# Patient Record
Sex: FEMALE | Race: WHITE
[De-identification: names, ages, dates, MRNs, and addresses within clinical notes are randomized per-mention and may not be internally consistent; named-entity substitution may affect disease eponyms.]

---

## 2018-08-22 ENCOUNTER — HOSPITAL ENCOUNTER (OUTPATIENT)
Dept: HOSPITAL 92 - SDC | Age: 38
Discharge: HOME | End: 2018-08-22
Attending: ORTHOPAEDIC SURGERY
Payer: COMMERCIAL

## 2018-08-22 VITALS — BODY MASS INDEX: 22.4 KG/M2

## 2018-08-22 DIAGNOSIS — S62.624A: Primary | ICD-10-CM

## 2018-08-22 DIAGNOSIS — Z79.1: ICD-10-CM
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
## 2018-08-22 LAB
BASOPHILS # BLD AUTO: 0.1 THOU/UL (ref 0–0.2)
BASOPHILS NFR BLD AUTO: 1.3 % (ref 0–1)
EOSINOPHIL # BLD AUTO: 0.2 THOU/UL (ref 0–0.7)
EOSINOPHIL NFR BLD AUTO: 1.9 % (ref 0–10)
HGB BLD-MCNC: 12.4 G/DL (ref 12–16)
LYMPHOCYTES # BLD: 3.2 THOU/UL (ref 1.2–3.4)
LYMPHOCYTES NFR BLD AUTO: 35.9 % (ref 21–51)
MCH RBC QN AUTO: 32.1 PG (ref 27–31)
MCV RBC AUTO: 92.6 FL (ref 78–98)
MONOCYTES # BLD AUTO: 0.5 THOU/UL (ref 0.11–0.59)
MONOCYTES NFR BLD AUTO: 5.4 % (ref 0–10)
NEUTROPHILS # BLD AUTO: 4.9 THOU/UL (ref 1.4–6.5)
NEUTROPHILS NFR BLD AUTO: 55.5 % (ref 42–75)
PLATELET # BLD AUTO: 212 THOU/UL (ref 130–400)
RBC # BLD AUTO: 3.85 MILL/UL (ref 4.2–5.4)
WBC # BLD AUTO: 8.8 THOU/UL (ref 4.8–10.8)

## 2018-08-22 PROCEDURE — A4216 STERILE WATER/SALINE, 10 ML: HCPCS

## 2018-08-22 PROCEDURE — 0PST04Z REPOSITION RIGHT FINGER PHALANX WITH INTERNAL FIXATION DEVICE, OPEN APPROACH: ICD-10-PCS | Performed by: ORTHOPAEDIC SURGERY

## 2018-08-22 PROCEDURE — C1713 ANCHOR/SCREW BN/BN,TIS/BN: HCPCS

## 2018-08-22 PROCEDURE — 85025 COMPLETE CBC W/AUTO DIFF WBC: CPT

## 2018-08-22 PROCEDURE — S0020 INJECTION, BUPIVICAINE HYDRO: HCPCS

## 2018-08-22 PROCEDURE — 96372 THER/PROPH/DIAG INJ SC/IM: CPT

## 2018-08-22 PROCEDURE — 0PBM0ZZ EXCISION OF RIGHT CARPAL, OPEN APPROACH: ICD-10-PCS | Performed by: ORTHOPAEDIC SURGERY

## 2018-08-22 PROCEDURE — 76001: CPT

## 2018-08-22 PROCEDURE — 84702 CHORIONIC GONADOTROPIN TEST: CPT

## 2018-08-22 NOTE — RAD
RIGHT FINGER TWO VIEW:

8/22/18

 

HISTORY: 

ORIF.

 

COMPARISON:  

Finger radiograph 8/14/18.

 

FINDINGS:  

Multiple fluoroscopic images were obtained. There are two screws through the middle phalanx base vola
r aspect of the ring finger. There is also a screw through the hamate.

 

IMPRESSION:  

Fluoroscopy for surgical use.

 

POS: CORNELIUS

## 2018-08-23 NOTE — OP
DATE OF PROCEDURE:  08/22/2018

 

PREOPERATIVE DIAGNOSIS:  Fracture dislocation, right ring finger proximal phalangeal joint (base of P
2 comminuted fracture with dislocation dorsal and sagittal plane).

 

POSTOPERATIVE DIAGNOSIS:  Fracture dislocation, right ring finger proximal phalangeal joint (base of 
P2 comminuted fracture with dislocation dorsal and sagittal plane).

 

PROCEDURE PERFORMED:

1.  C-arm supervision.

2.  Arthrotomy with volar plate release, proximal phalangeal joint, right ring finger.

3.  Open reduction and internal fixation proximal aspect of the middle phalanx intraarticular fractur
e with noni-noni osteochondral graft from the ipsilateral right side.

4.  Bone graft, open reduction and fixation of the hand make defect or hand make/carpal bone open red
uction internal fixation of the scaphoid.

 

ESTIMATED BLOOD LOSS:  20 mL

 

TOURNIQUET TIME:  25 minutes.

 

FINDINGS:  50% articular comminution and sagittal plane with gross instability, marked stretching of 
the full and irreparable comminution of the volar 40%-50% of the base of middle phalanx at the PIP deonte
int.

 

DESCRIPTION OF PROCEDURE:  After esophageal endotracheal anesthesia, limb was prepped and draped.  Th
e patient had the C-arm brought to the field.  Closed reduction was accomplished and although we coul
d see some bone, it whenever would align to less than 2 mm step off and therefore, we then proceeded 
to inject the patient.  After timeout was done with 20 mL 0.5% Marcaine, 10 at the level of the MP deonte
int and 10 at the harvest site of the 4th and 5th carpometacarpal joint or hammering.

 

We then volar approach made a Suzanne incision beginning radially distally at the DIP joint flexor cre
ase ulnarly across the PIP joint flexion crease and then radiated back towards the palm base.  This k
ept the skin and subcutaneous tissue medially.  A neuroplasty was performed identified neurovascular 
bundle, protected throughout the entire field.  We carried down, passed crease of the limbs to expose
 the collateral ligaments.  I then released in the sheath between the two A3 pulleys and then dissect
ed down to we could move the tendons completely ulnarly and completely radially.  First on the radial
 side, release to be performed.  Beginning on the radial side first, we performed a V-shaped release 
of the volar plate as far distal as possible.  We then dissected back, which re-created a flap that w
as proximally based.  This allowed for complete exposure to the joint.  We then noticed to visualize 
the fracture fragment which was in 4 pieces, none of which could even hold 0.035 K-wire, so we then r
emoved these, banded and open reduction internal fixation primarily.  We then from inside out/from th
e arthrotomy of the joint inside out, we released the collaterals from the base of the middle phalanx
.  This was done radially and ulnarly.  We then teased the dorsal capsule and was able to complete a 
shotgun with flexed and radially, the joint to visualize the comminution.  The comminution was severe
 and therefore it was initially approximately 35%-40% direct involvement, but there was so much subch
ondral comminution.  We then ended up making a rectangular shaped donor plateau for hamate graft and 
that was approximately 9 mm wide, 5.5 mm high, and 4 mm deep.

 

We reduced the shotgun joint, cup placed in moist Ray-Charley in this area and then proceeded to the wris
t.  Using this, I identified the 4th and 5th carpometacarpal joint, made a zigzag incision over this 
area as we had already given 10 mL of 0.5% Marcaine.  The patient then had the joint capsule opened b
y creating an ulnarly based flap, protecting the ulnar cutaneous nerves.  We then visualized the join
t, we had made our measurements as listed above and then at that same measurement of approximately 1 
mL with, 4.5 mm depth, and height of approximately 5 mm, we harvested a bone graft.  There was minima
l comminution of this technique and we did not have to make osteotomy at the base of the component of
 the metacarpals.  Now, we placed a moist sponge in the hamate defect, returned, sized the hamate fir
st and had to reduce its height and depth to make it measures for him as possible.  We then placed a 
K-wire to suck on 0.35, and then placed palmar to dorsal 1.5 screws with excellent fit.  Both screws 
were approximately 2 mm _____ we then changed to size 9 screws for a size 11.  Had excellent purchase
 and at this time we reduced the joint, radiographs show excellent position of the joint and the join
t was stable from -10 degrees extension passively to 110 degrees flexion.

 

We then closed the V incision of the volar plate into a wide fashion lengthening it somewhat.  We junior
sed the portion of collaterals that was still visible back onto its attachments, all of this with a 4
-0 Vicryl.  Then, we released the tourniquet and obtained hemostasis.  We had excellent 1 second refi
ll with the pink digit and we closed the skin with interrupted 5-0 nylon in simple pattern.

 

At this point, we had visualized radiographs and showed that the joint was stable from 0 degrees, ext
ension to 110 degrees of flexion.

 

The patient had the attention turned to the donor site.  Now, he had a defect that appeared to be sta
ble, but still defect so in order to prevent further problems, we then placed a block of bone graft t
hat was sized to fit the defect created in the hamate, used a standard drill measure screw technique 
and place this bone graft as _____ of effort.  Then, with the hemostasis obtained, we closed the caps
ule over the carpometacarpal joints with figure-of-eight 2-0 Vicryl, subcutaneous closed with 4-0 Mon
ocryl interrupted, the patient had the final epidermal closure performed with 4-0 nylon interrupted m
attress pattern.

 

A total of 30 mL was given with last five divided between the 2 primary dressings.  The patient was b
rought to the operating room, bulky dressing with a splint covering the fourth and fifth digit withou
t evidence of anesthetic or operative complication.

## 2019-01-29 ENCOUNTER — HOSPITAL ENCOUNTER (OUTPATIENT)
Dept: HOSPITAL 92 - SDC | Age: 39
Discharge: HOME | End: 2019-01-29
Attending: ORTHOPAEDIC SURGERY
Payer: COMMERCIAL

## 2019-01-29 VITALS — BODY MASS INDEX: 0.1 KG/M2

## 2019-01-29 DIAGNOSIS — G90.511: Primary | ICD-10-CM

## 2019-01-29 DIAGNOSIS — M65.841: ICD-10-CM

## 2019-01-29 DIAGNOSIS — Z98.890: ICD-10-CM

## 2019-01-29 DIAGNOSIS — M24.541: ICD-10-CM

## 2019-01-29 DIAGNOSIS — T84.220A: ICD-10-CM

## 2019-01-29 DIAGNOSIS — Z79.899: ICD-10-CM

## 2019-01-29 LAB
ANION GAP SERPL CALC-SCNC: 11 MMOL/L (ref 10–20)
BASOPHILS # BLD AUTO: 0.1 THOU/UL (ref 0–0.2)
BASOPHILS NFR BLD AUTO: 0.9 % (ref 0–1)
BUN SERPL-MCNC: 18 MG/DL (ref 7–18.7)
CALCIUM SERPL-MCNC: 9.4 MG/DL (ref 7.8–10.44)
CHLORIDE SERPL-SCNC: 109 MMOL/L (ref 98–107)
CO2 SERPL-SCNC: 24 MMOL/L (ref 22–29)
CREAT CL PREDICTED SERPL C-G-VRATE: 95 ML/MIN (ref 70–130)
CRYSTAL-AUWI FLAG: 0 (ref 0–15)
EOSINOPHIL # BLD AUTO: 0.3 THOU/UL (ref 0–0.7)
EOSINOPHIL NFR BLD AUTO: 3.8 % (ref 0–10)
GLUCOSE SERPL-MCNC: 94 MG/DL (ref 70–105)
HEV IGM SER QL: 1.4 (ref 0–7.99)
HGB BLD-MCNC: 12.3 G/DL (ref 12–16)
HYALINE CASTS #/AREA URNS LPF: (no result) LPF
LYMPHOCYTES # BLD: 2.5 THOU/UL (ref 1.2–3.4)
LYMPHOCYTES NFR BLD AUTO: 28.2 % (ref 21–51)
MCH RBC QN AUTO: 30.7 PG (ref 27–31)
MCV RBC AUTO: 93.9 FL (ref 78–98)
MONOCYTES # BLD AUTO: 0.7 THOU/UL (ref 0.11–0.59)
MONOCYTES NFR BLD AUTO: 7.6 % (ref 0–10)
NEUTROPHILS # BLD AUTO: 5.4 THOU/UL (ref 1.4–6.5)
NEUTROPHILS NFR BLD AUTO: 59.5 % (ref 42–75)
PATHC CAST-AUWI FLAG: 0.14 (ref 0–2.49)
PLATELET # BLD AUTO: 198 THOU/UL (ref 130–400)
POTASSIUM SERPL-SCNC: 4.2 MMOL/L (ref 3.5–5.1)
PREGS CONTROL BACKGROUND?: (no result)
PREGS CONTROL BAR APPEAR?: YES
RBC # BLD AUTO: 4.01 MILL/UL (ref 4.2–5.4)
RBC UR QL AUTO: (no result) HPF (ref 0–3)
SODIUM SERPL-SCNC: 140 MMOL/L (ref 136–145)
SP GR UR STRIP: 1.02 (ref 1–1.04)
SPERM-AUWI FLAG: 0 (ref 0–9.9)
WBC # BLD AUTO: 9 THOU/UL (ref 4.8–10.8)
WBC UR QL AUTO: (no result) HPF (ref 0–3)
YEAST-AUWI FLAG: 0 (ref 0–25)

## 2019-01-29 PROCEDURE — 76000 FLUOROSCOPY <1 HR PHYS/QHP: CPT

## 2019-01-29 PROCEDURE — S0020 INJECTION, BUPIVICAINE HYDRO: HCPCS

## 2019-01-29 PROCEDURE — A4306 DRUG DELIVERY SYSTEM <=50 ML: HCPCS

## 2019-01-29 PROCEDURE — 85025 COMPLETE CBC W/AUTO DIFF WBC: CPT

## 2019-01-29 PROCEDURE — 0RPW0JZ REMOVAL OF SYNTHETIC SUBSTITUTE FROM RIGHT FINGER PHALANGEAL JOINT, OPEN APPROACH: ICD-10-PCS | Performed by: ORTHOPAEDIC SURGERY

## 2019-01-29 PROCEDURE — 80048 BASIC METABOLIC PNL TOTAL CA: CPT

## 2019-01-29 PROCEDURE — 85652 RBC SED RATE AUTOMATED: CPT

## 2019-01-29 PROCEDURE — 81001 URINALYSIS AUTO W/SCOPE: CPT

## 2019-01-29 PROCEDURE — 0RNW0ZZ RELEASE RIGHT FINGER PHALANGEAL JOINT, OPEN APPROACH: ICD-10-PCS | Performed by: ORTHOPAEDIC SURGERY

## 2019-01-29 PROCEDURE — 84703 CHORIONIC GONADOTROPIN ASSAY: CPT

## 2019-01-29 PROCEDURE — 36415 COLL VENOUS BLD VENIPUNCTURE: CPT

## 2019-01-29 PROCEDURE — 0LN70ZZ RELEASE RIGHT HAND TENDON, OPEN APPROACH: ICD-10-PCS | Performed by: ORTHOPAEDIC SURGERY

## 2019-01-29 NOTE — OP
DATE OF PROCEDURE:  01/29/2019



PREOPERATIVE DIAGNOSES:  

1. Reflex sympathetic dystrophy, severe involving the right ring finger.

2. PIP joint contracture, volar greater than dorsal.

3. Tendon adhesions, flexion greater than extension.

4. All diagnoses were confirmed to include protruding screw causing irritation with

the tendons. 



POSTOPERATIVE DIAGNOSES:  

1. Reflex sympathetic dystrophy, severe involving the right ring finger.

2. PIP joint contracture, volar greater than dorsal.

3. Tendon adhesions, flexion greater than extension.

4. All diagnoses were confirmed to include protruding screw causing irritation with

the tendons. 



PROCEDURES PERFORMED:  

1. Right ring finger volar capsulotomy.

2. Right ring finger dorsal arthrotomy with capsulotomy.

3. C-arm supervision for the operation.

4. Right ring finger flexor digitorum superficialis tenolysis.

5. Right ring finger flexor digitorum profundus tenolysis.

6. Right ring finger flexor digitorum superficialis tenotomy.

7. Right ring finger A1 pulley release.

8. Right ring finger digital nerve neuroplasty.

9. Right ring finger deeper implant ( 2 x 1.5 screw removed from the previous

procedure). 



INDICATION FOR PROCEDURE:  The patient is now eight months after recurrent, unstable

volar capsular lesion, fracture dislocation where joint was constructed with a

noni-hamate bone graft applied screw fixation, but the patient developed severe RSD,

would not move the finger actively, and then now has loss of active and passive

motion. She had a preoperative passive loss of extension about 30 and preoperative

passive loss of flexion of volar 40 degrees active. We believed this had been

controlled with multiple injections and medication and now under block, we would

like to perform release  and restore as much function as possible. We warned the

patient that she will not be totally normal after this, however. 



TOURNIQUET TIME:  71 minutes.



BLOOD LOSS:  15 mL.



DESCRIPTION OF PROCEDURE:  After successful general LMA technique, the limb was

prepped and draped. The patient then had a time-out accomplished. We then noted that

passively she still did not have much more motion that she had on preop, so with

block in affect, we exsanguinated the prepped limp, inflated tourniquet to 250 mmHg

pressure, and then used a previous zigzag incision except we distended 1 cm distal

and 2 cm proximal to the level of the mid A1 and A2 pulley. Here, we saw immediate

adhesions of the skin to the tendon and tendon sheath, the sheath and tendon to the

neurovascular bundle, and very much adhesion of the flexor digitorum profundus to

the superficialis just distal to the chiasm. There was also marked adherence to the

remnants of the volar capsule and the screws. First, we performed a digital

neuroplasty to protect the neurovascular bundle completely and  it towards

the entire field from the underlying bone and tendon. Then, we performed minimal

extensive flexor tenolysis following going just slightly distal to the A4 pulley

call freeing it through the A4 for FDP and then freeing FDP from the FDS and then

FDS and FDP from the floor of the midportion of the middle phalanx. Then, we

performed a mini-arthrotomy, released the collaterals bluntly and sharply releasing

remnant of the palmar capsule and at this time, visualized that the screws were

tented over the flexor digitorum superficialis limb and this limb was on the radial

side was now adherent. On the ulnar side, was now adherent to both the screw and the

flexor digitorum profundus. Performed a tenotomy here to free this and then

performed a formal flexor tenolysis throughout the entire feel. Once we had done

this, excursion was great between the A2 and A4 pulley, but we did still could not

achieve flexion within a centimeter of palm space, which was our goal, so we

extended the incision proximally, performed the A1 pulley release, and then we were

able to flex to 90 degrees, but at 90 degrees, there was a dorsal block. Thus, we

then turned our attention to the dorsal digit, made a 2 cm incision zigzag over the

proximal phalangeal joint, carried through the skin and subcutaneous tissue, first

the ulnar side then the radial side. I made a longitudinal split of 1 cm between the

central slip and the lateral bands, carried it down to reach the dorsal capsule, and

then released the entire capsule on one side and then while we visualized the cut

from the other side, we performed  the same release and dorsal capsulotomy. At this

point, we could passively now flex the PIP joint to 120 degrees and the palm of the

finger contacted the tip at this right digit. 



We turned back to the flexor side, continued our very extensive flexor tenolysis,

even  the both limbs of the superficialis from the underlying bone to make

sure there is no adhesions while protecting neurovascular bundle. Now, we pulled in

the palm, the excursion was such that the patient could have passive extension to

-10 degrees and passive flexion into the palm tip of the digit or 120 degrees at the

PIP. 



We also saw that with extension there was some block and flexion, some block at the

end based on the screws, so we removed the screws and then once we removed the

screws, we saw radiographs with each screw removed at the bone was still stable

throughout the entire range of the fractured heel. 



We now released the tourniquet, felt we had adequately achieved functional

improvement, took radiographs with the tip of the digit passed and touching the

palm, and then we deflated the tourniquet. We obtained hemostasis and placed 3 mL of

Celestone palmarly and 2 dorsally. The patient had standard flexor tenolysis to

include the expansion of the  __________ space for gliding and there were no

complications. The patient then left the operating room after we released the

tourniquet, had 1 second capillary refill at distal tip and it was pink and then we

closed the wound with interrupted 4-0 Nylon at the primary release sites and no

splint was applied because she is going to therapy tomorrow morning and  we want her

to move it as much as possible, the block was still in effect in the recovery room. 







Job ID:  [id number]

## 2019-01-30 ENCOUNTER — HOSPITAL ENCOUNTER (EMERGENCY)
Dept: HOSPITAL 92 - ERS | Age: 39
Discharge: HOME | End: 2019-01-30
Payer: COMMERCIAL

## 2019-01-30 DIAGNOSIS — Z79.899: ICD-10-CM

## 2019-01-30 DIAGNOSIS — I95.9: Primary | ICD-10-CM

## 2019-01-30 LAB
BASOPHILS # BLD AUTO: 0.1 THOU/UL (ref 0–0.2)
BASOPHILS NFR BLD AUTO: 0.9 % (ref 0–1)
EOSINOPHIL # BLD AUTO: 0.2 THOU/UL (ref 0–0.7)
EOSINOPHIL NFR BLD AUTO: 1.7 % (ref 0–10)
HGB BLD-MCNC: 10.7 G/DL (ref 12–16)
LYMPHOCYTES # BLD: 3.9 THOU/UL (ref 1.2–3.4)
LYMPHOCYTES NFR BLD AUTO: 26.8 % (ref 21–51)
MCH RBC QN AUTO: 30.7 PG (ref 27–31)
MCV RBC AUTO: 95 FL (ref 78–98)
MONOCYTES # BLD AUTO: 0.8 THOU/UL (ref 0.11–0.59)
MONOCYTES NFR BLD AUTO: 5.7 % (ref 0–10)
NEUTROPHILS # BLD AUTO: 9.4 THOU/UL (ref 1.4–6.5)
NEUTROPHILS NFR BLD AUTO: 64.9 % (ref 42–75)
PLATELET # BLD AUTO: 169 THOU/UL (ref 130–400)
RBC # BLD AUTO: 3.47 MILL/UL (ref 4.2–5.4)
WBC # BLD AUTO: 14.5 THOU/UL (ref 4.8–10.8)

## 2019-01-30 PROCEDURE — 99284 EMERGENCY DEPT VISIT MOD MDM: CPT

## 2019-01-30 PROCEDURE — 85025 COMPLETE CBC W/AUTO DIFF WBC: CPT

## 2019-01-30 PROCEDURE — 36415 COLL VENOUS BLD VENIPUNCTURE: CPT

## 2019-06-12 ENCOUNTER — HOSPITAL ENCOUNTER (OUTPATIENT)
Dept: HOSPITAL 92 - LABBT | Age: 39
Discharge: HOME | End: 2019-06-12
Attending: ORTHOPAEDIC SURGERY
Payer: COMMERCIAL

## 2019-06-12 DIAGNOSIS — M19.141: ICD-10-CM

## 2019-06-12 DIAGNOSIS — Z01.812: Primary | ICD-10-CM

## 2019-06-12 DIAGNOSIS — M77.9: ICD-10-CM

## 2019-06-12 LAB
ANION GAP SERPL CALC-SCNC: 14 MMOL/L (ref 10–20)
BASOPHILS # BLD AUTO: 0 THOU/UL (ref 0–0.2)
BASOPHILS NFR BLD AUTO: 0.5 % (ref 0–1)
BUN SERPL-MCNC: 11 MG/DL (ref 7–18.7)
CALCIUM SERPL-MCNC: 9.4 MG/DL (ref 7.8–10.44)
CHLORIDE SERPL-SCNC: 106 MMOL/L (ref 98–107)
CO2 SERPL-SCNC: 27 MMOL/L (ref 22–29)
CREAT CL PREDICTED SERPL C-G-VRATE: 0 ML/MIN (ref 70–130)
EOSINOPHIL # BLD AUTO: 0.4 THOU/UL (ref 0–0.7)
EOSINOPHIL NFR BLD AUTO: 5.3 % (ref 0–10)
GLUCOSE SERPL-MCNC: 60 MG/DL (ref 70–105)
HGB BLD-MCNC: 12.2 G/DL (ref 12–16)
HYALINE CASTS #/AREA URNS LPF: (no result) LPF
LYMPHOCYTES # BLD: 2.8 THOU/UL (ref 1.2–3.4)
LYMPHOCYTES NFR BLD AUTO: 32.5 % (ref 21–51)
MCH RBC QN AUTO: 31.3 PG (ref 27–31)
MCV RBC AUTO: 92.8 FL (ref 78–98)
MONOCYTES # BLD AUTO: 0.5 THOU/UL (ref 0.11–0.59)
MONOCYTES NFR BLD AUTO: 5.4 % (ref 0–10)
NEUTROPHILS # BLD AUTO: 4.8 THOU/UL (ref 1.4–6.5)
NEUTROPHILS NFR BLD AUTO: 56.3 % (ref 42–75)
PLATELET # BLD AUTO: 217 THOU/UL (ref 130–400)
POTASSIUM SERPL-SCNC: 3.9 MMOL/L (ref 3.5–5.1)
PREGS CONTROL BACKGROUND?: (no result)
PREGS CONTROL BAR APPEAR?: YES
RBC # BLD AUTO: 3.89 MILL/UL (ref 4.2–5.4)
RBC UR QL AUTO: (no result) HPF (ref 0–3)
SODIUM SERPL-SCNC: 143 MMOL/L (ref 136–145)
WBC # BLD AUTO: 8.5 THOU/UL (ref 4.8–10.8)
WBC UR QL AUTO: (no result) HPF (ref 0–3)

## 2019-06-12 PROCEDURE — 84703 CHORIONIC GONADOTROPIN ASSAY: CPT

## 2019-06-12 PROCEDURE — 80048 BASIC METABOLIC PNL TOTAL CA: CPT

## 2019-06-12 PROCEDURE — 85025 COMPLETE CBC W/AUTO DIFF WBC: CPT

## 2019-06-12 PROCEDURE — 81015 MICROSCOPIC EXAM OF URINE: CPT

## 2019-06-14 ENCOUNTER — HOSPITAL ENCOUNTER (OUTPATIENT)
Dept: HOSPITAL 92 - SDC | Age: 39
Discharge: HOME | End: 2019-06-14
Attending: ORTHOPAEDIC SURGERY
Payer: COMMERCIAL

## 2019-06-14 VITALS — BODY MASS INDEX: 22.4 KG/M2

## 2019-06-14 DIAGNOSIS — Z79.899: ICD-10-CM

## 2019-06-14 DIAGNOSIS — M24.541: Primary | ICD-10-CM

## 2019-06-14 DIAGNOSIS — M19.141: ICD-10-CM

## 2019-06-14 DIAGNOSIS — M77.9: ICD-10-CM

## 2019-06-14 PROCEDURE — 76000 FLUOROSCOPY <1 HR PHYS/QHP: CPT

## 2019-06-14 PROCEDURE — 0LN70ZZ RELEASE RIGHT HAND TENDON, OPEN APPROACH: ICD-10-PCS | Performed by: ORTHOPAEDIC SURGERY

## 2019-06-14 PROCEDURE — 0RNW0ZZ RELEASE RIGHT FINGER PHALANGEAL JOINT, OPEN APPROACH: ICD-10-PCS | Performed by: ORTHOPAEDIC SURGERY

## 2019-06-14 PROCEDURE — C1776 JOINT DEVICE (IMPLANTABLE): HCPCS

## 2019-06-14 PROCEDURE — S0020 INJECTION, BUPIVICAINE HYDRO: HCPCS

## 2019-06-14 PROCEDURE — A4306 DRUG DELIVERY SYSTEM <=50 ML: HCPCS

## 2019-06-15 NOTE — OP
DATE OF PROCEDURE:  06/14/2019



PREOPERATIVE DIAGNOSES:  

1. 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

2. Extensor adhesions.

3. Flexor adhesions, right ring finger.

4. Right ring finger posttraumatic osteoarthritis, proximal phalangeal joint.



POSTOPERATIVE DIAGNOSES:  

1. 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

2. Extensor adhesions.

3. Flexor adhesions, right ring finger.

4. Right ring finger posttraumatic osteoarthritis, proximal phalangeal joint with

over 80% loss of articular surface at the proximal phalanx head and neck and with

50% loss on the base of the middle phalanx articular surface and proximally. 



PROCEDURE PERFORMED:  

1. Right ring finger extensor tenolysis.

2. Right ring finger flexor tenolysis.

3. Palmar PIP joint capsulotomy.

4. Dorsal proximal interphalangeal joint capsulotomy.

5. Proximal phalangeal joint total joint arthroplasty using size zero Delmis

[place]/right implant silastic without metallic rim. 



ESTIMATED BLOOD LOSS:  50 mL.



TOURNIQUET TIME:  102 minutes.



DESCRIPTION OF PROCEDURE:  After successful general endotracheal anesthesia by

[facility] augmented by a 20 mL of 0.5% Marcaine block at the level of

metacarpophalangeal joint and five along the palmar aspect, we then go more proximal

to this, we did a time-out and identified the site, side, procedure matching.  We

prepped and draped the area, the chart, history and physical, and the consent.  We

then used the C-arm to confirm the joint and the Marcaine was given by the patient

and surgeon agreement in preop. 



We then followed her old incision, extended it 5 mm distal and 1 cm proximal,

carried through skin and subcutaneous tissue.  We then saw flexor adhesions in the

area between the A2 and A4 pulley and lifted up the tendon and freed from the bone,

removed the scar tissue and then lifted up the tendon and on both sides of the

flexor tendon mass performed the PIP joint capsulectomy, check ring ligament

release, and the volar plate was released as well.  At this point, the patient had a

passive loss of -40 degrees extension and after this, we could achieve zero

extension without going back. 



We then went far proximal the palm to the A1 pulley, pulled on the flexor tendons

and could achieve flexion of the DIP joint of 30 degrees and made the whole finger

to about 1 cm from the palm. 



We then released the tourniquet, had a pink digit.  Placed Celestone in the area of

the capsulotomy and the flexor tenolysis, and then we closed this wound with

interrupted 4-0 nylon simple pattern.  After 15 minutes, we then waited an

additional 5 to have enough time to reinflate the tourniquet.  We exsanguinated the

limb, inflated tourniquet, and made a dorsal incision over the PIP joint.  We

carried this 1 cm distal to the joint and 2 cm proximal.  We then identified the

extensor mechanism, and did a Z opening where the longitudinal limb went down the

center raphae both sides of the extensor tendon and left central slip intact on the

bone. 



We then exposed the joint, which was now used to flex, freed the collaterals to the

point we could make complete 120 degree flexion shotgun of the joint.  We then

brought a TPS-type saw blade 5 mm wide, placed a Key elevator on the volar to the

head of the proximal phalanx and made a cut that involved just as far back enough to

where the collaterals were released.  We then tagged them with a [place] stitch 4-0

Prolene on both the radial and ulnar collaterals.  We then made a small palmar cut

as well. 



We then made from a palmar to dorsal cut of 1 mm bone off the articular surface base

of the middle phalanx.  During this inspection, we saw the amount of arthritic loss

described in "findings" above. 



We now began with starter awl progressively to ream and found it was very tight

canal, especially proximally.  We had to use a combination of a denae, neuro small

curette, straight and curved, and the starter awl once again along with the 2P and

2D to achieve enough space to even get a zero inside with a good fit.  Although

joint may have been able to tolerate one, could not achieve enough depth in the one

and the one prosthesis had overhang outside the level of the radial and ulnar limits

of the joint, so we did not use it.  We placed the zero trial and had excellent

stability.  We then chose a zero final Delmis [place], right silastic implant and

placed in an appropriate position and the joint was stable to 110 degrees of flexion

and +5 extension. 



Before we placed the final prosthesis then, we made a drill hole at the lateral edge

of the proximal part of the joint prothesis, the cut portion of the proximal

phalanx, and tied the collaterals through this hole using the 4-0 Prolene.  They had

excellent fit.  Now, the joint was stable with only approximately 2 to 3 mm

translation radial and ulnar stress at 0 and at 45 degrees.  We then prepared for

closure. 



We used a buried figure-of-eight interrupted suture throughout the entire Z opening

and closed them with no undue tension or tightening.  Once this was done, we could

again achieve 100 degrees of flexion without gap formation because we used a

Z-plasty closure and we released the tourniquet.  Had full extension. 



The patient then had a pink digit once again.  We closed the wound on the dorsal

surface with interrupted 4-0 nylon interrupted mattress pattern.  The patient left

the operating room without evidence of anesthetic or operative complication and in

appropriate splint with the MP joint at 60 degrees PIP joint and DIP straight. 







Job ID:  [id number]

## 2019-06-15 NOTE — RAD
RIGHT HAND:

6/15/19

 

Three fluoroscopic images obtained from OR. 

 

HISTORY: 

Imaging during right hand arthoplasty. 

 

FINDINGS/IMPRESSION: 

Fourth digit is imaged on this exam.  The images are suboptimal.

 

POS: OFF

## 2020-03-09 ENCOUNTER — HOSPITAL ENCOUNTER (OUTPATIENT)
Dept: HOSPITAL 92 - SDC | Age: 40
Discharge: HOME | End: 2020-03-09
Attending: ORTHOPAEDIC SURGERY
Payer: COMMERCIAL

## 2020-03-09 VITALS — BODY MASS INDEX: 21.7 KG/M2

## 2020-03-09 DIAGNOSIS — T81.89XA: Primary | ICD-10-CM

## 2020-03-09 LAB
BASOPHILS # BLD AUTO: 0.1 THOU/UL (ref 0–0.2)
BASOPHILS NFR BLD AUTO: 0.8 % (ref 0–1)
EOSINOPHIL # BLD AUTO: 0.2 THOU/UL (ref 0–0.7)
EOSINOPHIL NFR BLD AUTO: 2.5 % (ref 0–10)
HGB BLD-MCNC: 12 G/DL (ref 12–16)
LYMPHOCYTES # BLD: 3 THOU/UL (ref 1.2–3.4)
LYMPHOCYTES NFR BLD AUTO: 32.6 % (ref 21–51)
MCH RBC QN AUTO: 32 PG (ref 27–31)
MCV RBC AUTO: 94.8 FL (ref 78–98)
MONOCYTES # BLD AUTO: 0.6 THOU/UL (ref 0.11–0.59)
MONOCYTES NFR BLD AUTO: 6.8 % (ref 0–10)
NEUTROPHILS # BLD AUTO: 5.2 THOU/UL (ref 1.4–6.5)
NEUTROPHILS NFR BLD AUTO: 57.3 % (ref 42–75)
PLATELET # BLD AUTO: 234 THOU/UL (ref 130–400)
RBC # BLD AUTO: 3.75 MILL/UL (ref 4.2–5.4)
WBC # BLD AUTO: 9.1 THOU/UL (ref 4.8–10.8)

## 2020-03-09 PROCEDURE — 85025 COMPLETE CBC W/AUTO DIFF WBC: CPT

## 2020-03-09 PROCEDURE — 88305 TISSUE EXAM BY PATHOLOGIST: CPT

## 2020-03-09 PROCEDURE — 0HBFXZZ EXCISION OF RIGHT HAND SKIN, EXTERNAL APPROACH: ICD-10-PCS | Performed by: ORTHOPAEDIC SURGERY

## 2020-03-09 PROCEDURE — 36415 COLL VENOUS BLD VENIPUNCTURE: CPT

## 2020-03-09 PROCEDURE — S0020 INJECTION, BUPIVICAINE HYDRO: HCPCS

## 2020-03-09 NOTE — OP
DATE OF PROCEDURE:  03/09/2020



PREOPERATIVE DIAGNOSIS:  Right small finger suture granuloma with pain.  Findings,

no infection, 5 mm diameter granuloma underneath the pulley sutures from deep

ligaments repair, deep tendon repair, extensor mechanism. 



COMPLICATIONS:  None.



TOURNIQUET TIME:  7 minutes.



INDICATION:  The patient with history of RSD, who continues to have pain over suture

granuloma despite conservative care to include injection, steroid injection,

cessation of activity and therapy. 



DESCRIPTION OF PROCEDURE:  After successful anesthesia to include propofol, 15 mL of

0.5% Marcaine metacarpophalangeal block level, we waited 5 minutes after a block and

then exsanguinated the limb and inflated the tourniquet to 250 mmHg pressure. 



The patient then had the area undergo outline of an incision 5 mm distal and 5 mm

proximal to the mass or total of 15 mm.  The mass was ellipsed.  The skin was

removed in [place]/ellipse and underneath this was a large concentration of Prolene

suture.  All Prolene sutures under the mass were removed including a piece that was

within the mass.  Once this was done, the extensor mechanism was still intact

grossly and we were able to flex the finger passively into the digit and touched the

palm, which could not be done before the suture was removed. 



We then deflated the tourniquet, obtained hemostasis.  We placed 3 mL of Celestone

along the area of the granuloma bed after the granuloma was removed.  We obtained

hemostasis and closed the wound with interrupted 4-0 nylon in simple pattern.  The

patient left the operating room without evidence of anesthetic or operative

complication. 







Job ID:  [id number]

## 2023-02-22 ENCOUNTER — HOSPITAL ENCOUNTER (OUTPATIENT)
Dept: HOSPITAL 18 - NAV RAD | Age: 43
Discharge: HOME | End: 2023-02-22
Payer: COMMERCIAL

## 2023-02-22 DIAGNOSIS — M19.012: ICD-10-CM

## 2023-02-22 DIAGNOSIS — S46.002A: Primary | ICD-10-CM

## 2023-03-06 ENCOUNTER — HOSPITAL ENCOUNTER (OUTPATIENT)
Dept: HOSPITAL 92 - SCSMRI | Age: 43
Discharge: HOME | End: 2023-03-06
Attending: FAMILY MEDICINE
Payer: COMMERCIAL

## 2023-03-06 DIAGNOSIS — M75.52: ICD-10-CM

## 2023-03-06 DIAGNOSIS — S43.432A: ICD-10-CM

## 2023-03-06 DIAGNOSIS — S46.012A: ICD-10-CM

## 2023-03-06 DIAGNOSIS — M77.8: ICD-10-CM

## 2023-03-06 DIAGNOSIS — M67.814: ICD-10-CM

## 2023-03-06 DIAGNOSIS — S46.002A: Primary | ICD-10-CM
